# Patient Record
(demographics unavailable — no encounter records)

---

## 2024-10-18 NOTE — HISTORY OF PRESENT ILLNESS
[FreeTextEntry1] : Mr. Papito Kong is a 62-year-old man with hypertension, obstructive sleep apnea, a body mass index of 44kg/m2, clear cell renal carcinoma (status post a right nephrectomy), end-stage renal disease (previously on hemodialysis via a right radial AV fistula, status post a living donor renal transplant on January 30th, 2023) and persistent atrial fibrillation. He presents today for a follow-up after undergoing a catheter ablation of atrial fibrillation on September 10th, 2024.   To briefly summarize his history, he was first diagnosed with atrial fibrillation on March 6th, 2024. Eliquis 5mg twice daily was initiated at that time. His symptoms included shortness of breath, dyspnea on exertion and indigestion. He underwent a transesophageal echocardiogram and a direct current cardioversion on March 25th, 2024. Following the cardioversion, he was found to have a recurrence of atrial fibrillation on May 31st, 2024. He remained in atrial fibrillation in June of 2024. He underwent a second cardioversion on June 25th, 2024. Flecainide 100mg twice daily was initiated following the cardioversion. When he presented for an exercise stress test while on Flecainide he had evidence of a new right bundle branch block. This was no present prior to initiation of Flecainide. For this reason Flecainide was discontinued. He elected to undergo an ablation. His ablation on September 10th, 2024 included pulmonary vein isolation and posterior wall isolation using pulsed field ablation.  Since the procedure he reports that he has been feeling _____  CHADS2-VASC: 1 (HTN: 1)

## 2024-10-18 NOTE — CARDIOLOGY SUMMARY
[de-identified] : ECG from 10/25/2024:  ECG from 6/12/2024: Atrial fibrillation with a ventricular rate of 67bpm ECG from 3/21/2024: Atrial fibrillation with a ventricular rate of 75bpm ECG from 3/6/2024 (personally reviewed): AF with VR 83bpm [de-identified] : CHRIS from 3/25/2024: EF: 55-60%, no LA or SONIA thrombus, normal RV size and systolic function, mild LA dilation, no PFO, normal RV, trace PI  TTE from 11/8/2021: LA: 5.6, mild-mod MR, mild TR, trace PI, LA: "enlarged" RA: "enlarged" EF: 55-60%, grossly normal RV size and systolic function, "LV diastolic function is present" left ventricular hypertrophy [de-identified] : Nuclear stress test from 6/18/2024: normal myocardial perfusion scan with no evidence of infarction or inducible ischemia, EF: 58% [de-identified] : Ablation from 9/10/2024: HV: 57ms, normal LA voltage, PVI and PW with PFA, no inducible arrhythmias  DCCV from 6/25/2024: DCCV 360J, Flecainide 100mg bid started post DCCV  DCCV from 3/25/2024: Single 360J DCCV for persistent atrial fibrillation

## 2024-10-25 NOTE — PHYSICAL EXAM
[Well Developed] : well developed [Well Nourished] : well nourished [No Acute Distress] : no acute distress [Normal Venous Pressure] : normal venous pressure [Normal S1, S2] : normal S1, S2 [No Murmur] : no murmur [No Rub] : no rub [No Gallop] : no gallop [Clear Lung Fields] : clear lung fields [Good Air Entry] : good air entry [No Respiratory Distress] : no respiratory distress  [Soft] : abdomen soft [Non Tender] : non-tender [No Rash] : no rash [Moves all extremities] : moves all extremities [Alert and Oriented] : alert and oriented [Normal memory] : normal memory [de-identified] : trace lower extremity edema

## 2024-10-25 NOTE — CARDIOLOGY SUMMARY
[de-identified] : ECG from 10/25/2024: Sinus rhythm with a first degree AV delay (UT: 258ms) and a RBBB (QRSd: 150ms) ECG from 7/8/2024: Sinus with UT: 250ms ECG from 6/12/2024: Atrial fibrillation with a ventricular rate of 67bpm ECG from 3/21/2024: Atrial fibrillation with a ventricular rate of 75bpm ECG from 3/6/2024 (personally reviewed): AF with VR 83bpm [de-identified] : CHRIS from 3/25/2024: EF: 55-60%, no LA or SONIA thrombus, normal RV size and systolic function, mild LA dilation, no PFO, normal RV, trace PI  TTE from 11/8/2021: LA: 5.6, mild-mod MR, mild TR, trace PI, LA: "enlarged" RA: "enlarged" EF: 55-60%, grossly normal RV size and systolic function, "LV diastolic function is present" left ventricular hypertrophy [de-identified] : Nuclear stress test from 6/18/2024: normal myocardial perfusion scan with no evidence of infarction or inducible ischemia, EF: 58% [de-identified] : Ablation from 9/10/2024: HV: 57ms, normal LA voltage, PVI and PW with PFA, no inducible arrhythmias  DCCV from 6/25/2024: DCCV 360J, Flecainide 100mg bid started post DCCV  DCCV from 3/25/2024: Single 360J DCCV for persistent atrial fibrillation

## 2024-10-25 NOTE — HISTORY OF PRESENT ILLNESS
[FreeTextEntry1] : Mr. Papito Kong is a 62-year-old man with hypertension, obstructive sleep apnea, a body mass index of 44kg/m2, clear cell renal carcinoma (status post a right nephrectomy), end-stage renal disease (previously on hemodialysis via a right radial AV fistula, status post a living donor renal transplant on January 30th, 2023) and persistent atrial fibrillation. He presents today for a follow-up after undergoing a catheter ablation of atrial fibrillation on September 10th, 2024.   To briefly summarize his history, he was first diagnosed with atrial fibrillation on March 6th, 2024. Eliquis 5mg twice daily was initiated at that time. His symptoms included shortness of breath, dyspnea on exertion and indigestion. He underwent a transesophageal echocardiogram and a direct current cardioversion on March 25th, 2024. Following the cardioversion, he was found to have a recurrence of atrial fibrillation on May 31st, 2024. He remained in atrial fibrillation in June of 2024. He underwent a second cardioversion on June 25th, 2024. Flecainide 100mg twice daily was initiated following the cardioversion. When he presented for an exercise stress test while on Flecainide he had evidence of a new right bundle branch block. This was no present prior to initiation of Flecainide. For this reason Flecainide was discontinued. He elected to undergo an ablation. His ablation on September 10th, 2024 included pulmonary vein isolation and posterior wall isolation using pulsed field ablation.  Since the procedure he reports that he has been feeling well. He had brief palpitations within the first few weeks after the procedure. Presently he denies chest pain, shortness of breath, dizziness, palpitations, lightheadedness, lower extremity swelling, pre-syncope or syncope. He has resumed exercising with the goal of loosing weight.  CHADS2-VASC: 1 (HTN: 1)

## 2024-10-25 NOTE — REVIEW OF SYSTEMS
[Cough] : cough [Negative] : Heme/Lymph [FreeTextEntry3] : Worsening vision - headaches with new glasses, resolved when he changed back to old glasses [FreeTextEntry4] : Sinus infection - treated with Doxycycline [FreeTextEntry5] : see HPI

## 2024-10-25 NOTE — DISCUSSION/SUMMARY
[FreeTextEntry1] : Mr. Papito Kong is a 62-year-old man with hypertension, obstructive sleep apnea, a body mass index of 44kg/m2, clear cell renal carcinoma (status post a right nephrectomy), end-stage renal disease (previously on hemodialysis via a right radial AV fistula, status post a living donor renal transplant on January 30th, 2023) and persistent atrial fibrillation. He presents today for a follow-up after undergoing a catheter ablation of atrial fibrillation on September 10th, 2024. He is doing well following the ablation without a recurrence based on his symptoms.   We discussed the mechanisms of arrythmia recurrence following an ablation of atrial fibrillation. His CHADS2-VASC is one. We discussed the risks and benefits of continuing versus discontinuing oral anticoagulation. For the time being he intends to remain on oral anticoagulation. We discussed risk factor modification (he reports that he no longer has sleep apnea). I recommended he see me again in six months or sooner as needed. I recommended a two-week Zio XT monitor to be performed one month prior to his next visit with me.   [EKG obtained to assist in diagnosis and management of assessed problem(s)] : EKG obtained to assist in diagnosis and management of assessed problem(s)

## 2025-01-27 NOTE — PHYSICAL EXAM
[Well Nourished] : well nourished [Well Developed] : well developed [Well-Appearing] : well-appearing [Normal Oropharynx] : the oropharynx was normal [No Lymphadenopathy] : no lymphadenopathy [Supple] : supple [No Respiratory Distress] : no respiratory distress  [Clear to Auscultation] : lungs were clear to auscultation bilaterally [Normal] : normal rate, regular rhythm, normal S1 and S2 and no murmur heard [No Edema] : there was no peripheral edema [Soft] : abdomen soft [Non Tender] : non-tender [No Rash] : no rash [Normal Mood] : the mood was normal [de-identified] : left TM - +edema, +purulence, tender when pulling left earlobe

## 2025-01-27 NOTE — PLAN
[FreeTextEntry1] : Start Augmentin x 7 days. Continue Cortisporin drops. Use Flonase. Rest / fluids. Call office PRN.

## 2025-01-27 NOTE — REVIEW OF SYSTEMS
[Fatigue] : fatigue [Sore Throat] : sore throat [Earache] : earache [Nasal Discharge] : nasal discharge [Fever] : no fever [Chills] : no chills [Discharge] : no discharge [Vision Problems] : no vision problems [Chest Pain] : no chest pain [Shortness Of Breath] : no shortness of breath [Wheezing] : no wheezing [Cough] : no cough

## 2025-01-27 NOTE — HISTORY OF PRESENT ILLNESS
[FreeTextEntry8] : Patient comes for an acute visit.   He is here for evaluation of intense left ear pain. No fever or chills. He has associated sinus pressure and left-sided tooth pain. He has nasal congestion. He went to  this morning, was told had left otitis externa and prescribed Cortisporin drops. He would like to see if needs PO antibiotic. He is travelling [place] this weekend.

## 2025-01-31 NOTE — REASON FOR VISIT
[Subsequent Evaluation] : a subsequent evaluation for [FreeTextEntry2] : Lt ear pain/ Lt sinus- started 3-4 days ago

## 2025-01-31 NOTE — HISTORY OF PRESENT ILLNESS
[de-identified] :  Pt with h/o of throat irritation and PND, s/p Endoscopic ballon frontal sinusotomy with balloon dilatation, eddy sphenoidotomy with ballon dilatation, maxillary antrostomy with removal contents and balloon dilatation and eddy ethmoids and excision right nasal mass 2/22/24 presents for left ear pain. States first went to  and was told he had an ear infection, than went to primary care who confirmed possible ear infection and was placed on amoxicillin. States went to dentist and advised no signs of tooth infection. Here as his left ear pain and left side of his face still has pain. He notes pain worst when he chews.He has been waking up with dry mouth in the morning. Scheduled to go on 10 day cruise tomorrow.

## 2025-01-31 NOTE — PHYSICAL EXAM
[de-identified] : parotid gland tenderness [de-identified] : left tmj tenderness [Hearing Loss Right Only] : normal [Hearing Loss Left Only] : normal [Midline] : trachea located in midline position [de-identified] : dry mouth [Normal] : no rashes

## 2025-01-31 NOTE — CONSULT LETTER
[Dear  ___] : Dear  [unfilled], [Courtesy Letter:] : I had the pleasure of seeing your patient, [unfilled], in my office today. [Please see my note below.] : Please see my note below. [Referral Closing:] : Thank you very much for seeing this patient.  If you have any questions, please do not hesitate to contact me. [Sincerely,] : Sincerely, [FreeTextEntry3] : Elijah Garnett PA-C

## 2025-02-12 NOTE — HISTORY OF PRESENT ILLNESS
[de-identified] : Pt with h/o nasal polyps, chronic pansinusitis, chronic rhinitis, PND, s/p Endoscopic ballon frontal sinusotomy with balloon dilatation, eddy sphenoidotomy with ballon dilatation, maxillary antrostomy with removal contents and balloon dilatation and eddy ethmoids and excision right nasal mass 2/22/24, and throat irritation presents today stating that he had left parotitis about 1-2 weeks ago which has improved with antibiotics. He denies using a CPAP machine, but he states that he gets great sleep.

## 2025-02-12 NOTE — ASSESSMENT
[FreeTextEntry1] :  Reviewed and reconciled medications, allergies, PMHx, PSHx, SocHx, FMHx.  Pt with h/o nasal polyps, chronic pansinusitis, chronic rhinitis, PND, s/p Endoscopic ballon frontal sinusotomy with balloon dilatation, eddy sphenoidotomy with ballon dilatation, maxillary antrostomy with removal contents and balloon dilatation and dedy ethmoids and excision right nasal mass 2/22/24, and throat irritation presents today stating that he had left parotitis about 1-2 weeks ago which has improved with antibiotics. He denies using a CPAP machine, but he states that he gets great sleep.  Physical exam: -ears are clean and clear bilaterally -nasally open and clear -mild edema of the uvula -type 2 oral cavity -no drainage from the parotid ducts intraorally -parotid fullness L>R - nontender  ENT Procedure: Flexible nasal endoscopy -middle meatus clean and clear bilaterally  Plan: -Drink plenty of water -Use sour foods/drinks -FU in 6 months

## 2025-02-12 NOTE — ADDENDUM
[FreeTextEntry1] :  Documented by Juan Francisco Kenney acting as scribe for Dr. Mariscal on 02/12/2025. All Medical record entries made by the Scribe were at my, Dr. Mariscal, direction and personally dictated by me on 02/12/2025 . I have reviewed the chart and agree that the record accurately reflects my personal performance of the history, physical exam, assessment and plan. I have also personally directed, reviewed, and agreed with the discharge instructions.

## 2025-02-12 NOTE — CONSULT LETTER
[Dear  ___] : Dear  [unfilled], [Courtesy Letter:] : I had the pleasure of seeing your patient, [unfilled], in my office today. [Please see my note below.] : Please see my note below. [Consult Closing:] : Thank you very much for allowing me to participate in the care of this patient.  If you have any questions, please do not hesitate to contact me. [Sincerely,] : Sincerely, [FreeTextEntry3] :  Woody Mariscal MD FACS

## 2025-02-12 NOTE — PROCEDURE
[Recalcitrant Symptoms] : recalcitrant symptoms  [Post-Op Patency] : post-op patency [Flexible Endoscope] : examined with the flexible endoscope [de-identified] : Dr. Mariscal [de-identified] : nasal polyps, chronic sinusitis, chronic rhinitis [FreeTextEntry6] :  Procedure: Flexible Nasal Endoscopy: Risks, benefits, and alternatives of flexible endoscopy were explained to the patient. The patient gave oral consent to proceed. The flexible scope was inserted into the right nasal cavity. Endoscopy of the inferior and middle meatus was performed. No polyp, mass, or lesion was appreciated. Olfactory cleft was clear. Spheno-ethmoid recess is clear. Nasopharynx was clear. Turbinates were without mass. The procedure was repeated on the contralateral side with similar findings. -middle meatus clean and clear bilaterally

## 2025-02-12 NOTE — PHYSICAL EXAM
[Midline] : trachea located in midline position [Normal] : no rashes [de-identified] : parotid fullness L>R - nontender [Hearing Loss Right Only] : normal [Hearing Loss Left Only] : normal [de-identified] : mild edema of the uvula. type 2 oral cavity. no drainage from the parotid ducts intraorally

## 2025-03-07 NOTE — PHYSICAL EXAM
[Midline] : trachea located in midline position [Normal] : no rashes [de-identified] : right parotid gland tenderness [de-identified] : right tmj tenderness [Hearing Loss Right Only] : normal [Hearing Loss Left Only] : normal [de-identified] : mastoid tenderness [de-identified] : clear rhinorrhea  [de-identified] : dry mouth

## 2025-03-07 NOTE — HISTORY OF PRESENT ILLNESS
[de-identified] :  Pt with h/o of throat irritation and PND, s/p Endoscopic ballon frontal sinusotomy with balloon dilatation, eddy sphenoidotomy with ballon dilatation, maxillary antrostomy with removal contents and balloon dilatation and eddy ethmoids and excision right nasal mass 2/22/24 presents for right ear pain. Patient states his blood levels for his rejection meds for his kidneys are abnormal which he currently is off meds at this time. He states first went to  and was told he had a urinary tract infection was placed on ciprofloxacin. Patient is no longer on antibiotics now after finishing course. Several days ago developed right ear pain and felt a pop in his ear and worsening tinnitus. Also with runny nose and slight post nasal drip.

## 2025-03-07 NOTE — ASSESSMENT
[FreeTextEntry1] : Reviewed and reconciled medications, allergies, PMHx, PSHx, SocHx, FMHx.    physical exam: right ear: clean and clear left ear: clean and clear  mastoid tenderss right tmj tenderness right parotid tenderness right Inflamed turbinates class 2 tonsils   Procedure:  Flexible Nasal Endoscopy: Risks, benefits, and alternatives of flexible endoscopy were explained to the patient. The patient gave oral consent to proceed.  The flexible scope was inserted into the right nasal cavity.  Endoscopy of the inferior and middle meatus was performed.  middle meatus clean and clear bilaterally   audio 3/7/25: Tymps type A au abnormal etf au AS: hearing wnl sloping to mod-severe rising to mild snhl AD: Mild sloping to mod-severe rising to moderate snhl.      Plan: Cephlexin for 10 days  Flonase - 2 sprays bilaterally QD, spray laterally. for etf Ct temporal bone to evaluate mastoid and evaluate change in hearing Follow up in 2 weeks     Case discussed with Dr. Mariscal

## 2025-03-18 NOTE — REVIEW OF SYSTEMS
[Fatigue] : fatigue [Sore Throat] : sore throat [Fever] : no fever [Chills] : no chills [Discharge] : no discharge [Dryness] : dryness [Vision Problems] : no vision problems [Chest Pain] : no chest pain [Shortness Of Breath] : no shortness of breath [Cough] : no cough

## 2025-03-18 NOTE — PHYSICAL EXAM
[No Acute Distress] : no acute distress [Well Nourished] : well nourished [Well Developed] : well developed [Well-Appearing] : well-appearing [Normal Oropharynx] : the oropharynx was normal [Normal TMs] : both tympanic membranes were normal [No Lymphadenopathy] : no lymphadenopathy [Supple] : supple [No Respiratory Distress] : no respiratory distress  [Clear to Auscultation] : lungs were clear to auscultation bilaterally [No Edema] : there was no peripheral edema [Soft] : abdomen soft [Non Tender] : non-tender [Non-distended] : non-distended [No Rash] : no rash [Normal Mood] : the mood was normal [19023 - High Complexity requires an extensive number of possible diagnoses and/or the management options, extensive complexity of the medical data (tests, etc.) to be reviewed, and a high risk of significant complications, morbidity, and/or mortality as w] : High Complexity  [Normal] : normal gait, coordination grossly intact, no focal deficits and deep tendon reflexes were 2+ and symmetric [Normal Affect] : the affect was normal [de-identified] : unable to fully close right eye [de-identified] : right-sided facial paralysis

## 2025-03-18 NOTE — HISTORY OF PRESENT ILLNESS
[de-identified] :  Pt with h/o of throat irritation and PND, s/p Endoscopic ballon frontal sinusotomy with balloon dilatation, eddy sphenoidotomy with ballon dilatation, maxillary antrostomy with removal contents and balloon dilatation and eddy ethmoids and excision right nasal mass 2/22/24 presents for right ear pain. Patient states his blood levels for his rejection meds for his kidneys are abnormal which he currently is off meds at this time. He states first went to  and was told he had a urinary tract infection was placed on ciprofloxacin. Patient is no longer on antibiotics now after finishing course. Several days ago developed right ear pain and felt a pop in his ear and worsening tinnitus. Also with runny nose and slight post nasal drip.  [FreeTextEntry1] : 3/18/25: Patient presents as a follow up visit.Admitted for R facial paralysis, ID, transplant nephro, neuro were consulted. 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 He  notes some discomfort on the right ear which he is unsure is related to his bells palsy.

## 2025-03-18 NOTE — PHYSICAL EXAM
[Normal] : lingual tonsils are normal [Midline] : trachea located in midline position [de-identified] : right tmj tenderness [Hearing Loss Right Only] : normal [Hearing Loss Left Only] : normal [de-identified] : clear rhinorrhea  [de-identified] : dry mouth [FreeTextEntry2] : right-sided facial paralysis [de-identified] : unable to fully close right eye

## 2025-03-18 NOTE — HISTORY OF PRESENT ILLNESS
[Post-hospitalization from ___ Hospital] : Post-hospitalization from [unfilled] Hospital [Admitted on: ___] : The patient was admitted on [unfilled] [Discharged on ___] : discharged on [unfilled] [Discharge Summary] : discharge summary [Pertinent Labs] : pertinent labs [Radiology Findings] : radiology findings [Discharge Med List] : discharge medication list [Med Reconciliation] : medication reconciliation has been completed [Patient Contacted By: ____] : and contacted by [unfilled] [FreeTextEntry2] : Admitted for R facial paralysis, ID, transplant nephro, neuro were consulted. 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 No bony erosive changes or soft tissue lesions. MMF was held during the duration of treatment with prednisone. MR brain performed with no clots or infarcts. Patient was also found to be hypertensive, and was started on amlodipine 10mg daily. Ophthalmology was also consulted for blurry vision and for zoster ophthalmicus rule out, with subsequent resolution of symptoms. Was treated with latonoprost to the left eye as well as artificial tears to both eyes.  Important Medication Changes and Reason: - decrease envarus to 2mg - started amlodipine 10mg daily for hypertension - hold cellcept while taking prednisone 60mg and continue 3/15 - complete 7 day course of prednisone 60 mg daily 3/8-3/14, then continue to take prednisone 5mg daily for immunosuppression (kidney transplant recipient).  He is feeling about the same. Still with right-sided facial paralysis. He has mild numbness/tingling along right side of face into ear. He is able to eat and drink without difficulty. He completed the Prednisone taper. He is seeing ENT this afternoon.

## 2025-03-18 NOTE — PLAN
[FreeTextEntry1] : Pt advised facial paralysis can sometimes last up to 3-4 months. Exact duration is unknown. Pt to complete Valtrex today. He will continue Prednisone 5 mg daily. Follow up with ENT Dr. Mariscal this afternoon. Follow up with ophthalmology. Continue Latanoprost and artificial tears to right eye.  HTN - BP controlled on Amlodipine and Metoprolol.

## 2025-03-18 NOTE — ASSESSMENT
[FreeTextEntry1] : Reviewed and reconciled medications, allergies, PMHx, PSHx, SocHx, FMHx.    physical exam: unable to fully close right eye right-sided facial paralysis right ear: clean and clear left ear: clean and clear  tmj tenderness right Inflamed turbinates class 2 tonsils     audio 3/7/25: Tymps type A au abnormal etf au AS: hearing wnl sloping to mod-severe rising to mild snhl AD: Mild sloping to mod-severe rising to moderate snhl.    ct iacs non:  RIGHT: Small lobulated lesion in the Prussak's space, thickening of tympanic membrane with mild soft tissue thickening along bony external auditory canal may reflect chronic inflammatory changes versus cholesteatoma. No associated bony erosion.   Mri iacs: No retrocochlear lesions on noncontrast evaluation. Recommend postcontrast IAC imaging as well non echoplanar DWI for evaluation of small soft lesion seen in Prussak's space seen on CT temporal bone from 3/7/2025.    Plan: Discomfort right side of the face/ear consistent with [place] palsy. Regarding small lobulated lesion it is unlikely to be causing patient any discomfort  and can be further worked up with imaging as recommended by radiology. Given patient current active bells palsy will opt for further studies once bells palsy resolves. His audio prior to bells palsy was unchanged from one done in 2019.   Patient to follow up in 3 months.   Case discussed with Dr. Mariscal Vaginal Delivery

## 2025-03-27 NOTE — HISTORY OF PRESENT ILLNESS
[FreeTextEntry1] : 61 year old male with ESRD was on IHD , now s/p LRRT from brother on 1/30/2023.  Other PMH includes- HTN, morbid obesity s/p 100+ lb weight loss by diet & exercise, EBEN (resolved s/p weight loss), BPH, clear cell renal carcinoma s/p right nephrectomy on 3/2022  He gained 20 pounds since txp.  BP is good   He had an ablation for Afib  He was recently admitted for Bell's Palsy sec to Shingles. treated with IV Acyclovir and changed to oral valtrex at time of discharge  Denies any fever, chills, dysuria, LE edema, cough, sob, chest pain , nausea, vomiting , diarrhea, constipation or any other  complaints.

## 2025-03-27 NOTE — ASSESSMENT
[FreeTextEntry1] : 61 year old male with ESRD s/p LRRT from brother on 1/30/2023.  1. s/p LRRT from brother on 1/30/2023- Allograft function with baseline Cr at 1.6-1.7  , now upto 2.3 from recent hospital admission. has a f/u with Dr octavio hahn and we will check repeat labs in 1 month.  2.Immunosuppression meds- on Evarsus  3 mg po daily, for goal ( 4-6) , Cellcept reduced to 500 mg po bid and Prednisone 5 mg po daily.  3.Hypertension - on Metoprolol and amlodipine  4. BPH- On Flomax and Proscar 5. [place] palsy with Shingles- . treated with IV Acyclovir and changed to oral valtrex at time of discharge. needs f/u with ID

## 2025-05-19 NOTE — HISTORY OF PRESENT ILLNESS
[FreeTextEntry1] : Mr. Papito Kong is a 63-year-old man with hypertension, obstructive sleep apnea, a body mass index of 42kg/m2, clear cell renal carcinoma (status post a right nephrectomy), end-stage renal disease (previously on hemodialysis via a right radial AV fistula, status post a living donor renal transplant on January 30th, 2023) and persistent atrial fibrillation (status post a catheter ablation of atrial fibrillation on September 10th, 2024). He presents today for a follow-up visit.   To briefly summarize his history, he was first diagnosed with atrial fibrillation on March 6th, 2024. Eliquis 5mg twice daily was initiated at that time. His symptoms included shortness of breath, dyspnea on exertion and indigestion. He underwent a transesophageal echocardiogram and a direct current cardioversion on March 25th, 2024. Following the cardioversion, he was found to have a recurrence of atrial fibrillation on May 31st, 2024. He remained in atrial fibrillation in June of 2024. He underwent a second cardioversion on June 25th, 2024. Flecainide 100mg twice daily was initiated following the cardioversion. When he presented for an exercise stress test while on Flecainide he had evidence of a new right bundle branch block. This was no present prior to initiation of Flecainide. For this reason, Flecainide was discontinued. He elected to undergo a catheter ablation. His ablation on September 10th, 2024 included pulmonary vein isolation and posterior wall isolation using pulsed field ablation.  Since his last follow-up visit with me on October 25th, 2024, he has been feeling _____  CHADS2-VASC: 1 (HTN: 1)

## 2025-05-19 NOTE — CARDIOLOGY SUMMARY
[de-identified] : ECG from 5/23/2025:  ECG from 10/25/2024: Sinus rhythm with a first-degree AV delay (WV: 258ms) and a RBBB (QRSd: 150ms) ECG from 7/8/2024: Sinus with WV: 250ms ECG from 6/12/2024: Atrial fibrillation with a ventricular rate of 67bpm ECG from 3/21/2024: Atrial fibrillation with a ventricular rate of 75bpm ECG from 3/6/2024 (personally reviewed): AF with VR 83bpm [de-identified] : CHRIS from 3/25/2024: EF: 55-60%, no LA or SONIA thrombus, normal RV size and systolic function, mild LA dilation, no PFO, normal RV, trace PI  TTE from 11/8/2021: LA: 5.6, mild-mod MR, mild TR, trace PI, LA: "enlarged" RA: "enlarged" EF: 55-60%, grossly normal RV size and systolic function, "LV diastolic function is present" left ventricular hypertrophy [de-identified] : Nuclear stress test from 6/18/2024: normal myocardial perfusion scan with no evidence of infarction or inducible ischemia, EF: 58% [de-identified] : Ablation from 9/10/2024: HV: 57ms, normal LA voltage, PVI and PW with PFA, no inducible arrhythmias  DCCV from 6/25/2024: DCCV 360J, Flecainide 100mg bid started post DCCV  DCCV from 3/25/2024: Single 360J DCCV for persistent atrial fibrillation 16

## 2025-05-28 NOTE — HISTORY OF PRESENT ILLNESS
[FreeTextEntry8] : Patient comes for an acute visit.   He went to  on 5/25 with fever up to 101F, productive cough, nasal congestion, PND, and wheeze x 3 days. Tested negative for covid, flu, and strep. CXR was negative for PNA. He was prescribed Amoxicillin, Tessalon, and Albuterol inhaler.

## 2025-05-28 NOTE — REVIEW OF SYSTEMS
[Fatigue] : fatigue [Nasal Discharge] : nasal discharge [Fever] : fever [Chills] : no chills [Discharge] : no discharge [Vision Problems] : no vision problems [Earache] : no earache [Postnasal Drip] : postnasal drip [Sore Throat] : no sore throat [Chest Pain] : no chest pain [Shortness Of Breath] : no shortness of breath [Wheezing] : no wheezing [Cough] : cough

## 2025-05-28 NOTE — PHYSICAL EXAM
[Well Nourished] : well nourished [Well Developed] : well developed [Well-Appearing] : well-appearing [Normal Oropharynx] : the oropharynx was normal [No Lymphadenopathy] : no lymphadenopathy [Supple] : supple [No Respiratory Distress] : no respiratory distress  [Clear to Auscultation] : lungs were clear to auscultation bilaterally [Normal] : normal rate, regular rhythm, normal S1 and S2 and no murmur heard [No Edema] : there was no peripheral edema [Soft] : abdomen soft [Non Tender] : non-tender [No Rash] : no rash [Normal Mood] : the mood was normal [Normal TMs] : both tympanic membranes were normal [de-identified] : ++PND++ [de-identified] : clear

## 2025-05-28 NOTE — PLAN
[FreeTextEntry1] : Pt with URI. CXR negative for PNA. Switch Amoxicillin to Augmentin x 7 days. Use Flonase for PND. Promethazine-DM PRN cough. Rest / fluids. Call office PRN.

## 2025-06-13 NOTE — DISCUSSION/SUMMARY
[FreeTextEntry1] : Mr. Papito oKng is a 63-year-old man with hypertension, obstructive sleep apnea, a body mass index of 42kg/m2, clear cell renal carcinoma (status post a right nephrectomy), end-stage renal disease (previously on hemodialysis via a right radial AV fistula, status post a living donor renal transplant on January 30th, 2023), Bell's palsy and persistent atrial fibrillation (status post a catheter ablation of atrial fibrillation on September 10th, 2024). He presents today for a follow-up visit. He is doing well. Clinically he has not had a recurrence of atrial fibrillation.  I recommended a one-week Zio XT monitor to assess for asymptomatic paroxysmal atrial fibrillation. Given his CHADS2-VASC score is one, we spoke about the risks and benefits of continuing versus discontinuing oral anticoagulation. The patient experiences bruising so he intends to stop anticoagulation. I will see him again in six months or sooner as needed. All questions were answered to the patient's apparent satisfaction.   [EKG obtained to assist in diagnosis and management of assessed problem(s)] : EKG obtained to assist in diagnosis and management of assessed problem(s)

## 2025-06-13 NOTE — CARDIOLOGY SUMMARY
[de-identified] : ECG from 6/13/2025: Sinus rhythm with a first-degree AV delay (TN: 220ms), RBBB (QRSd: 163ms) ECG from 10/25/2024: Sinus rhythm with a first-degree AV delay (TN: 258ms) and a RBBB (QRSd: 150ms) ECG from 7/8/2024: Sinus with TN: 250ms ECG from 6/12/2024: Atrial fibrillation with a ventricular rate of 67bpm ECG from 3/21/2024: Atrial fibrillation with a ventricular rate of 75bpm ECG from 3/6/2024 (personally reviewed): AF with VR 83bpm [de-identified] : CHRIS from 3/25/2024: EF: 55-60%, no LA or SONIA thrombus, normal RV size and systolic function, mild LA dilation, no PFO, normal RV, trace PI  TTE from 11/8/2021: LA: 5.6, mild-mod MR, mild TR, trace PI, LA: "enlarged" RA: "enlarged" EF: 55-60%, grossly normal RV size and systolic function, "LV diastolic function is present" left ventricular hypertrophy [de-identified] : Nuclear stress test from 6/18/2024: normal myocardial perfusion scan with no evidence of infarction or inducible ischemia, EF: 58% [de-identified] : Ablation from 9/10/2024: HV: 57ms, normal LA voltage, PVI and PW with PFA, no inducible arrhythmias  DCCV from 6/25/2024: DCCV 360J, Flecainide 100mg bid started post DCCV  DCCV from 3/25/2024: Single 360J DCCV for persistent atrial fibrillation

## 2025-06-13 NOTE — PHYSICAL EXAM
[Well Developed] : well developed [Well Nourished] : well nourished [No Acute Distress] : no acute distress [Normal Venous Pressure] : normal venous pressure [Normal S1, S2] : normal S1, S2 [No Murmur] : no murmur [No Rub] : no rub [No Gallop] : no gallop [Clear Lung Fields] : clear lung fields [Good Air Entry] : good air entry [No Respiratory Distress] : no respiratory distress  [Soft] : abdomen soft [Non Tender] : non-tender [Normal Gait] : normal gait [No Cyanosis] : no cyanosis [No Rash] : no rash [No Skin Lesions] : no skin lesions [Moves all extremities] : moves all extremities [Normal Speech] : normal speech [Alert and Oriented] : alert and oriented [Normal memory] : normal memory [de-identified] : Facial assymetry  [de-identified] : 1+ b/l LE edema [de-identified] : Circular rash on RLE - per patient Dermatology has said the rash is eczema

## 2025-06-13 NOTE — DISCUSSION/SUMMARY
[FreeTextEntry1] : Mr. Papito Kong is a 63-year-old man with hypertension, obstructive sleep apnea, a body mass index of 42kg/m2, clear cell renal carcinoma (status post a right nephrectomy), end-stage renal disease (previously on hemodialysis via a right radial AV fistula, status post a living donor renal transplant on January 30th, 2023), Bell's palsy and persistent atrial fibrillation (status post a catheter ablation of atrial fibrillation on September 10th, 2024). He presents today for a follow-up visit. He is doing well. Clinically he has not had a recurrence of atrial fibrillation.  I recommended a one-week Zio XT monitor to assess for asymptomatic paroxysmal atrial fibrillation. Given his CHADS2-VASC score is one, we spoke about the risks and benefits of continuing versus discontinuing oral anticoagulation. The patient experiences bruising so he intends to stop anticoagulation. I will see him again in six months or sooner as needed. All questions were answered to the patient's apparent satisfaction.   [EKG obtained to assist in diagnosis and management of assessed problem(s)] : EKG obtained to assist in diagnosis and management of assessed problem(s)

## 2025-06-13 NOTE — HISTORY OF PRESENT ILLNESS
[FreeTextEntry1] : Mr. Papito Kong is a 63-year-old man with hypertension, obstructive sleep apnea, a body mass index of 42kg/m2, clear cell renal carcinoma (status post a right nephrectomy), end-stage renal disease (previously on hemodialysis via a right radial AV fistula, status post a living donor renal transplant on January 30th, 2023), Bell's palsy and persistent atrial fibrillation (status post a catheter ablation of atrial fibrillation on September 10th, 2024). He presents today for a follow-up visit.   To briefly summarize his history, he was first diagnosed with atrial fibrillation on March 6th, 2024. Eliquis 5mg twice daily was initiated at that time. His symptoms included shortness of breath, dyspnea on exertion and indigestion. He underwent a transesophageal echocardiogram and a direct current cardioversion on March 25th, 2024. Following the cardioversion, he was found to have a recurrence of atrial fibrillation on May 31st, 2024. He remained in atrial fibrillation in June of 2024. He underwent a second cardioversion on June 25th, 2024. Flecainide 100mg twice daily was initiated following the cardioversion. When he presented for an exercise stress test while on Flecainide he had evidence of a new right bundle branch block. This was no present prior to initiation of Flecainide. For this reason, Flecainide was discontinued. He elected to undergo a catheter ablation. His ablation on September 10th, 2024 included pulmonary vein isolation and posterior wall isolation using pulsed field ablation.  Since his last follow-up visit with me on October 25th, 2024, he has been feeling well. He developed Bell's palsy. No reports of chest pain, shortness of breath, dizziness, palpitations, lightheadedness, pre-syncope or syncope.  CHADS2-VASC: 1 (HTN: 1)

## 2025-06-13 NOTE — CARDIOLOGY SUMMARY
[de-identified] : ECG from 6/13/2025: Sinus rhythm with a first-degree AV delay (ID: 220ms), RBBB (QRSd: 163ms) ECG from 10/25/2024: Sinus rhythm with a first-degree AV delay (ID: 258ms) and a RBBB (QRSd: 150ms) ECG from 7/8/2024: Sinus with ID: 250ms ECG from 6/12/2024: Atrial fibrillation with a ventricular rate of 67bpm ECG from 3/21/2024: Atrial fibrillation with a ventricular rate of 75bpm ECG from 3/6/2024 (personally reviewed): AF with VR 83bpm [de-identified] : CHRIS from 3/25/2024: EF: 55-60%, no LA or SONIA thrombus, normal RV size and systolic function, mild LA dilation, no PFO, normal RV, trace PI  TTE from 11/8/2021: LA: 5.6, mild-mod MR, mild TR, trace PI, LA: "enlarged" RA: "enlarged" EF: 55-60%, grossly normal RV size and systolic function, "LV diastolic function is present" left ventricular hypertrophy [de-identified] : Nuclear stress test from 6/18/2024: normal myocardial perfusion scan with no evidence of infarction or inducible ischemia, EF: 58% [de-identified] : Ablation from 9/10/2024: HV: 57ms, normal LA voltage, PVI and PW with PFA, no inducible arrhythmias  DCCV from 6/25/2024: DCCV 360J, Flecainide 100mg bid started post DCCV  DCCV from 3/25/2024: Single 360J DCCV for persistent atrial fibrillation

## 2025-06-13 NOTE — PHYSICAL EXAM
[Well Developed] : well developed [Well Nourished] : well nourished [No Acute Distress] : no acute distress [Normal Venous Pressure] : normal venous pressure [Normal S1, S2] : normal S1, S2 [No Murmur] : no murmur [No Rub] : no rub [No Gallop] : no gallop [Clear Lung Fields] : clear lung fields [Good Air Entry] : good air entry [No Respiratory Distress] : no respiratory distress  [Soft] : abdomen soft [Non Tender] : non-tender [Normal Gait] : normal gait [No Cyanosis] : no cyanosis [No Rash] : no rash [No Skin Lesions] : no skin lesions [Moves all extremities] : moves all extremities [Normal Speech] : normal speech [Alert and Oriented] : alert and oriented [Normal memory] : normal memory [de-identified] : Facial assymetry  [de-identified] : 1+ b/l LE edema [de-identified] : Circular rash on RLE - per patient Dermatology has said the rash is eczema

## 2025-06-18 NOTE — HISTORY OF PRESENT ILLNESS
[de-identified] :  Pt with h/o of throat irritation and PND, s/p Endoscopic ballon frontal sinusotomy with balloon dilatation, eddy sphenoidotomy with ballon dilatation, maxillary antrostomy with removal contents and balloon dilatation and eddy ethmoids and excision right nasal mass 2/22/24 presents for right ear pain. Patient states his blood levels for his rejection meds for his kidneys are abnormal which he currently is off meds at this time. He states first went to  and was told he had a urinary tract infection was placed on ciprofloxacin. Patient is no longer on antibiotics now after finishing course. Several days ago developed right ear pain and felt a pop in his ear and worsening tinnitus. Also with runny nose and slight post nasal drip.  3/18/25: Patient presents as a follow up visit.Admitted for R facial paralysis, ID, transplant nephro, neuro were consulted. 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 He  notes some discomfort on the right ear which he is unsure is related to his bells palsy.  [FreeTextEntry1] : 6/18/25: Patient presents as a follow up states has been having some discomfort in the right ear. Notes still has r facial paralysis but it is improving. He notes that he has been having feeling like he cant blow his right side of his nose since the bells palsy.

## 2025-06-18 NOTE — ASSESSMENT
[FreeTextEntry1] : Reviewed and reconciled medications, allergies, PMHx, PSHx, SocHx, FMHx.    physical exam: unable to fully close right eye right-sided facial paralysis right ear: cerumen removed via curettage/suction left ear: clean and clear  tmj tenderness right Inflamed turbinates nasal valve collapse right worst than left with positive shelby class 2 tonsils     Plan: Try breathe right strips to help with nasal valve and can try  Flonase - 2 sprays bilaterally QD, spray laterally. We discussed vivaer procedure which patient is not interested in doing at this time.  Patient to follow up in 3 months.   Case discussed with Dr. Mariscal

## 2025-06-18 NOTE — PHYSICAL EXAM
[de-identified] : right tmj tenderness [Hearing Loss Right Only] : normal [Hearing Loss Left Only] : normal [FreeTextEntry8] : cerumen removed via curettage/suction  [de-identified] : nasal valve collapse right worst than left with positive shelby [de-identified] : clear rhinorrhea  [Normal] : mucosa is normal [Midline] : trachea located in midline position [FreeTextEntry2] : right-sided facial paralysis [de-identified] : unable to fully close right eye
